# Patient Record
Sex: MALE | Race: WHITE | NOT HISPANIC OR LATINO | URBAN - METROPOLITAN AREA
[De-identification: names, ages, dates, MRNs, and addresses within clinical notes are randomized per-mention and may not be internally consistent; named-entity substitution may affect disease eponyms.]

---

## 2017-01-06 ENCOUNTER — EMERGENCY (EMERGENCY)
Facility: HOSPITAL | Age: 57
LOS: 1 days | Discharge: HOME | End: 2017-01-06
Attending: EMERGENCY MEDICINE | Admitting: EMERGENCY MEDICINE
Payer: COMMERCIAL

## 2017-01-06 VITALS
SYSTOLIC BLOOD PRESSURE: 132 MMHG | TEMPERATURE: 99 F | OXYGEN SATURATION: 99 % | RESPIRATION RATE: 16 BRPM | HEART RATE: 76 BPM | DIASTOLIC BLOOD PRESSURE: 80 MMHG

## 2017-01-06 VITALS
RESPIRATION RATE: 16 BRPM | HEART RATE: 76 BPM | OXYGEN SATURATION: 99 % | SYSTOLIC BLOOD PRESSURE: 131 MMHG | DIASTOLIC BLOOD PRESSURE: 82 MMHG | TEMPERATURE: 99 F

## 2017-01-06 DIAGNOSIS — S20.212A CONTUSION OF LEFT FRONT WALL OF THORAX, INITIAL ENCOUNTER: ICD-10-CM

## 2017-01-06 DIAGNOSIS — S09.90XA UNSPECIFIED INJURY OF HEAD, INITIAL ENCOUNTER: ICD-10-CM
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
## 2017-01-06 DIAGNOSIS — R51 HEADACHE: ICD-10-CM
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
## 2017-01-06 PROCEDURE — 99284 EMERGENCY DEPT VISIT MOD MDM: CPT

## 2017-01-06 PROCEDURE — 73030 X-RAY EXAM OF SHOULDER: CPT | Mod: 26,LT

## 2017-01-06 PROCEDURE — 70450 CT HEAD/BRAIN W/O DYE: CPT | Mod: 26

## 2017-01-06 PROCEDURE — 71100 X-RAY EXAM RIBS UNI 2 VIEWS: CPT

## 2017-01-06 PROCEDURE — 71020: CPT | Mod: 26

## 2017-01-06 PROCEDURE — 71046 X-RAY EXAM CHEST 2 VIEWS: CPT

## 2017-01-06 PROCEDURE — 70450 CT HEAD/BRAIN W/O DYE: CPT

## 2017-01-06 PROCEDURE — 99284 EMERGENCY DEPT VISIT MOD MDM: CPT | Mod: 25

## 2017-01-06 PROCEDURE — 73030 X-RAY EXAM OF SHOULDER: CPT

## 2017-01-06 PROCEDURE — 71100 X-RAY EXAM RIBS UNI 2 VIEWS: CPT | Mod: 26,LT

## 2017-01-06 RX ORDER — ONDANSETRON 8 MG/1
4 TABLET, FILM COATED ORAL ONCE
Qty: 0 | Refills: 0 | Status: COMPLETED | OUTPATIENT
Start: 2017-01-06 | End: 2017-01-06

## 2017-01-06 RX ORDER — IBUPROFEN 200 MG
1 TABLET ORAL
Qty: 30 | Refills: 0 | OUTPATIENT
Start: 2017-01-06

## 2017-01-06 RX ADMIN — ONDANSETRON 4 MILLIGRAM(S): 8 TABLET, FILM COATED ORAL at 15:58
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
## 2017-01-06 NOTE — ED PROVIDER NOTE - MUSCULOSKELETAL, MLM
Spine appears normal, pain with palpation of left side/ribs, left anterior shoulder.  pain with rom of left shoulder
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
## 2023-02-21 NOTE — ED PROVIDER NOTE - NS HIV RISK FACTOR YES
Colon and Rectal Surgery Postoperative Clinic Note     Referring provider:  Riley Magdaleno MD  [address] [place] [address]       RE: Kalin Shepard  : 1948  REGGIE: 3/16/2023      Kalin Shepard is a very pleasant 74 year old male with rectosigmoid cancer who underwent emergent exploratory laparotomy, small bowel resection and loop descending colostomy in 2022 followed by 12 rounds of chemotherapy, MRI showed evidence of residual tumor.  Now s/p exploratory laparotomy, lysis of adhesions, 2 hours, low anterior resection, take down of loop colostomy, diverting loop ileostomy, flexible sigmoidoscopy on 23.     Final Diagnosis   A. SIGMOID AND RECTUM, RESECTION:  -Adenocarcinoma, moderate to poorly differentiated, invading through the wall to involve the adjacent adherent perirectal fibroadipose tissue  -Tumor size: 3 cm in greatest dimension  -Resection margins free of involvement  -No evidence of lymphovascular or perineural invasion  -Treatment effect: Poor response (score 3)  -No tumor deposits identified  -Thirty-three benign lymph nodes (0/33)  -Sections of vas deferens with no evidence of involvement by adenocarcinoma.  -Please see tumor synoptic for details     B. RECTAL STUMP:  -Segment of rectal wall with no evidence of malignancy     C.  COLON, PROXIMAL ANASTAMOTIC RING:  -Segment of colonic wall with no evidence of malignancy     D.  COLON, DISTAL ANASTAMOTIC RING :  -Segment of colonic wall with no evidence of malignancy     E. LOOP COLOSTOMY:  -Segment of colonic wall with no evidence of malignancy     F. OMENTUM, OMENTECTOMY:  -Fibroadipose tissue with no evidence of malignancy      Interval history: Having 5-6/10 pain. Is taking oxycodone three times a day. Not taking tylenol. Taking Neurontin at night.   Taking one imodium three times a day and Metamucil twice a day. Ostomy outputs are under 500. Is still getting IVF twice a week and feels like he gets  dehydrated between. No pouching issues. Eating and drinking without nausea.       Assessment/Plan:  74 year old male with recurrent rectosigmoid cancer now 5 week(s) status post  exploratory laparotomy, lysis of adhesions, 2 hours, low anterior resection, take down of loop colostomy, diverting loop ileostomy, flexible sigmoidoscopy .    -GGE scheduled.  -Plan for loop ileostomy reversal.    1. Preoperative labs: CBC, CMP, PTT/INR, Prealbumin.  2. No prep needed.  3. Hold these medications prior to surgery: none  4. Advised patient to continue protein shakes: Premier or Pure protein given high protein, low carb ratio for pre-operative rehab.      PLEASE SEE NOTE BELOW FOR PHYSICAL EXAMINATION, REVIEW OF SYSTEMS, AND OTHER HISTORY.    Riley Magdaleno MD  Division of Colon and Rectal Surgery   [facility]  [phone]    -------------------------------------------------------------------------------------------------------------------    Medical history:  Past Medical History:   Diagnosis Date     Cancer (H)        Surgical history:  Past Surgical History:   Procedure Laterality Date     COLECTOMY LEFT N/A 2/8/2023    Procedure: Exploratory Laparotomy, lysis of adhesions, Low Anterior Resection, take down of loop colostomy,  DIVERTING LOOP ILEOSTOMY;  Surgeon: Riley Magdaleno MD;  Location: [facility] OR     COMBINED CYSTOSCOPY, INSERT STENT URETER(S) Bilateral 2/8/2023    Procedure: CYSTOSCOPY, WITH URETERAL STENT INSERTION [[id number]];  Surgeon: Ulises Basilio MD;  Location: [facility] OR     INSERT PORT VASCULAR ACCESS Right 4/21/2022    Procedure: INSERTION, VASCULAR ACCESS PORT;  Surgeon: Marianne Shcroeder MD;  Location: [facility]     INSERT PORT VASCULAR ACCESS Left 6/2/2022    Procedure: INSERTION, VASCULAR ACCESS PORT;  Surgeon: Phong Finch MD;  Location: [facility] OR     IR CHEST PORT PLACEMENT > 5 YRS OF AGE  6/2/2022     LAPAROTOMY EXPLORATORY N/A 3/26/2022    Procedure: exploratory laparotomy,  small bowel resection, colostomy creation;  Surgeon: Riley Magdaleno MD;  Location: [facility] OR     LAPAROTOMY, LYSIS ADHESIONS, COMBINED N/A 8/24/2022    Procedure: LAPAROTOMY, EXPLORATORY, WITH LYSIS OF ADHESIONS;  Surgeon: Segundo Hill MD;  Location: [facility]     PICC INSERTION - DOUBLE LUMEN Left 03/28/2022    left medial brachial 5 fr dl picc 49 cm     SIGMOIDOSCOPY FLEXIBLE N/A 3/26/2022    Procedure: Sigmoidoscopy flexible;  Surgeon: Riley Magdaleno MD;  Location: [facility] OR     SIGMOIDOSCOPY FLEXIBLE N/A 2/8/2023    Procedure: Sigmoidoscopy flexible;  Surgeon: Riley Magdaleno MD;  Location: [facility] OR       Problem list:  Patient Active Problem List    Diagnosis Date Noted     High output ileostomy (H) 02/14/2023     Priority: Medium     Cancer of rectosigmoid (colon) (H) 02/08/2023     Priority: Medium     Chemotherapy-induced peripheral neuropathy (H) 09/26/2022     Priority: Medium     Peritonitis, acute generalized (H) 08/29/2022     Priority: Medium     SBO (small bowel obstruction) (H) 08/21/2022     Priority: Medium     Cancer cachexia (H) 08/21/2022     Priority: Medium     Esophageal reflux 08/21/2022     Priority: Medium     Chemotherapy-induced neutropenia (H) 08/02/2022     Priority: Medium     Dehydration 07/19/2022     Priority: Medium     Cancer associated pain 06/20/2022     Priority: Medium     Oxycodone 5mg bid.  2 month supply given 6/20/22       Adenocarcinoma of sigmoid colon (H) 04/13/2022     Priority: Medium     Colon adenocarcinoma (H) 04/11/2022     Priority: Medium     Peritoneal involvement.  Resection of obstruction, small intestine adherent to mass.  Unable to resect sigmoid mass, extensive involvement.         Colostomy present (H) 04/11/2022     Priority: Medium     Cachexia (H) 04/11/2022     Priority: Medium       Medications:  Current Outpatient Medications   Medication Sig Dispense Refill     acetaminophen (TYLENOL) 500 MG tablet Take 2 tablets (1,000 mg) by mouth 4 times  "daily 56 tablet 0     enoxaparin ANTICOAGULANT (LOVENOX) 40 MG/0.4ML syringe Inject 0.4 mLs (40 mg) Subcutaneous every 24 hours for 24 days 9.6 mL 0     gabapentin (NEURONTIN) 100 MG capsule Take 1 capsule (100 mg) by mouth At Bedtime 14 capsule 0     heparin 100 UNIT/ML SOLN injection 5-10 mLs by Intracatheter route every 28 days       lactated ringers infusion Inject 1,000 mLs into the vein in the morning, Mon and Thur for 6 doses 6000 mL 0     loperamide (IMODIUM) 2 MG capsule Take 1 capsule (2 mg) by mouth 3 times daily 120 capsule 0     mirtazapine (REMERON) 15 MG tablet Take 1 tablet (15 mg) by mouth At Bedtime 90 tablet 3     multivitamin (CENTRUM SILVER) tablet Take 1 tablet by mouth every morning 30 tablet      omeprazole (PRILOSEC) 20 MG DR capsule Take 1 capsule (20 mg) by mouth 2 times daily 60 capsule 3     Ostomy Supplies MISC 1 each Every Mon, Wed, Fri Morning Hollister1 piece flat fecal with filter #9782 20 pouches per month   2\" barrier ring #6095 (1 per pouch)   Adapt powder #7906   No sting film barrier # 5655   Adapt odor eliminator and lubricant 236ml bottle # 68155   M-9 Spray room deodorizer #1669     1 each 11     oxyCODONE (ROXICODONE) 5 MG tablet Take 1-2 tablets (5-10 mg) by mouth every 6 hours as needed for moderate to severe pain 20 tablet 0     psyllium (METAMUCIL/KONSYL) Packet Take 1 packet by mouth 2 times daily 60 packet 0       Allergies:  No Known Allergies    Family history:  Family History   Problem Relation Age of Onset     No Known Problems Mother      Prostate Cancer Father      Colon Cancer Father      Lymphoma Father      Anesthesia Reaction No family hx of      Thrombosis No family hx of        Social history:  Social History     Socioeconomic History     Marital status:      Spouse name: Not on file     Number of children: 3     Years of education: Not on file     Highest education level: Not on file   Occupational History     Occupation: retired   Tobacco Use     " "Smoking status: Every Day     Packs/day: 0.50     Years: 50.00     Pack years: 25.00     Types: Cigarettes     Smokeless tobacco: Never   Vaping Use     Vaping Use: Never used   Substance and Sexual Activity     Alcohol use: Not Currently     Drug use: Never     Sexual activity: Not on file   Other Topics Concern     Not on file   Social History Narrative     Not on file     Social Determinants of Health     Financial Resource Strain: Not on file   Food Insecurity: Not on file   Transportation Needs: Not on file   Physical Activity: Not on file   Stress: Not on file   Social Connections: Not on file   Intimate Partner Violence: Not on file   Housing Stability: Not on file         Physical Examination:  /82 (BP Location: Left arm, Patient Position: Sitting, Cuff Size: Adult Regular)   Pulse 104   Ht 1.803 m (5' 11\")   Wt 61.2 kg (135 lb)   SpO2 99%   BMI 18.83 kg/m    General: NAD  Abdomen: soft, NTND incision well healed, ostomy pink and healthy  Extremities: wwp    Digital rectal examination: Was performed.   Sphincter tone: Good.  Palpable lesions: No.  Prostate: Not assessed.  Other: None..    Procedures:  Prior to the start of the procedure and with procedural staff participation, I verbally confirmed the patient s identity using two indicators, relevant allergies, that the procedure was appropriate and matched the consent or emergent situation, and that the correct equipment/implants were available. Immediately prior to starting the procedure I conducted the Time Out with the procedural staff and re-confirmed the patient s name, procedure, and site/side. (The Joint Commission universal protocol was followed.)  Yes    Sedation (Moderate or Deep): None    Flexible sigmoidoscopy was performed to 25cm. Anastomosis was identified without obvious abnormality, widely healthy and patent. Pictures taken, uploaded into epic.      " Declined